# Patient Record
Sex: FEMALE | Race: BLACK OR AFRICAN AMERICAN | NOT HISPANIC OR LATINO | Employment: FULL TIME | URBAN - METROPOLITAN AREA
[De-identification: names, ages, dates, MRNs, and addresses within clinical notes are randomized per-mention and may not be internally consistent; named-entity substitution may affect disease eponyms.]

---

## 2017-07-06 ENCOUNTER — TELEPHONE (OUTPATIENT)
Dept: SURGERY | Facility: CLINIC | Age: 57
End: 2017-07-06

## 2017-07-06 NOTE — TELEPHONE ENCOUNTER
----- Message from Layla Lynn sent at 7/6/2017 10:13 AM CDT -----  Contact: self  Good morning,     Pt would like a call back may be experiencing complications from surgery done last year.  Pt has been having hurting in her  chest when she eats or drink. It's like a soreness/discomfort in her chest.   Pt would like to advice on what she should do.    Pt can be reached at [phone].    Thank you.

## 2017-07-11 ENCOUNTER — OFFICE VISIT (OUTPATIENT)
Dept: SURGERY | Facility: CLINIC | Age: 57
End: 2017-07-11
Payer: COMMERCIAL

## 2017-07-11 VITALS
BODY MASS INDEX: 36.88 KG/M2 | TEMPERATURE: 99 F | HEART RATE: 73 BPM | SYSTOLIC BLOOD PRESSURE: 137 MMHG | DIASTOLIC BLOOD PRESSURE: 91 MMHG | HEIGHT: 63 IN | WEIGHT: 208.13 LBS

## 2017-07-11 DIAGNOSIS — R10.13 EPIGASTRIC PAIN: ICD-10-CM

## 2017-07-11 PROBLEM — R12 HEARTBURN: Status: ACTIVE | Noted: 2017-07-11

## 2017-07-11 PROBLEM — R12 HEARTBURN: Status: RESOLVED | Noted: 2017-07-11 | Resolved: 2017-07-11

## 2017-07-11 PROCEDURE — 99213 OFFICE O/P EST LOW 20 MIN: CPT | Mod: S$GLB,,, | Performed by: SURGERY

## 2017-07-11 PROCEDURE — 99999 PR PBB SHADOW E&M-EST. PATIENT-LVL III: CPT | Mod: PBBFAC,,, | Performed by: SURGERY

## 2017-07-11 NOTE — PROGRESS NOTES
Subjective:       Patient ID: Ginette Osorio is a 56 y.o. female.    Chief Complaint: Follow-up (difficulity s/p lap hh repair)    HPI S/p lap hh with Toupet fundoplication 6/16.  She started having some epigastric pain about 2 months ago associated with some nausea.  The pain is worsened with eating except seafood and occasionally with drinking.   There are no improving factors and she has tried ppi without improvement.  The pain radiates to the left and right sides of the chest.  Review of Systems   Constitutional: Negative for fever and unexpected weight change.   Respiratory: Negative for chest tightness.    Cardiovascular: Positive for chest pain.        Chest pain at rest   Gastrointestinal: Positive for constipation. Negative for diarrhea and vomiting.   Genitourinary: Negative for difficulty urinating and dysuria.   Hematological:        Has easy bruising but no easy bleeding       Objective:      Physical Exam    Assessment:       1. Epigastric pain      Possible gallstones, rule out recurrent hiatal hernia  Plan:       Obtain ugi and u/s.

## 2017-07-11 NOTE — LETTER
[facility] - General Surgery  [address]  [facility] [address]  Phone: [phone] July 11, 2017        Viviana Vasquez MD  [address] [place] [address]    Patient: Ginette Osorio   MR Number: [id number]   YOB: 1960   Date of Visit: 7/11/2017     Dear Dr. Vasquez:    Thank you for referring Ginette Osorio to me for evaluation. Below are the relevant portions of my assessment and plan of care.    Assessment:  Patient presents with epigastric pain  Possible gallstones, rule out recurrent hiatal hernia.    Plan:   -  Obtain UGI  -  Ultrasound      If you have questions, please do not hesitate to call me. I look forward to following Ginette along with you.    Sincerely,      Bin Peter MD   Section Head - General, Laparoscopic, Bariatric  Acute Care and Oncologic Surgery   - Surgical Weight Loss Program  [facility]    WSR/ciera    CC  Homer Matamoros MD

## 2017-07-13 ENCOUNTER — TELEPHONE (OUTPATIENT)
Dept: SURGERY | Facility: CLINIC | Age: 57
End: 2017-07-13

## 2017-07-13 NOTE — TELEPHONE ENCOUNTER
Pt states she got her test scheduled at [facility].  She will notify us once the test have been complete.

## 2017-07-13 NOTE — TELEPHONE ENCOUNTER
----- Message from Stiven Gunderson sent at 7/13/2017  8:53 AM CDT -----  Radha     Pt wants you to set up the two test she needs. Please call pt at [phone]

## 2017-08-03 ENCOUNTER — DOCUMENTATION ONLY (OUTPATIENT)
Dept: SURGERY | Facility: CLINIC | Age: 57
End: 2017-08-03

## 2017-08-04 ENCOUNTER — TELEPHONE (OUTPATIENT)
Dept: SURGERY | Facility: CLINIC | Age: 57
End: 2017-08-04

## 2017-08-04 DIAGNOSIS — R10.13 EPIGASTRIC PAIN: Primary | ICD-10-CM

## 2017-08-04 NOTE — TELEPHONE ENCOUNTER
Informed Miss barber that she is needing a HIDA SCAN with cck and I can fax over the order to [facility] @ [phone]. Pt v/u